# Patient Record
Sex: FEMALE | Race: ASIAN | Employment: PART TIME | URBAN - METROPOLITAN AREA
[De-identification: names, ages, dates, MRNs, and addresses within clinical notes are randomized per-mention and may not be internally consistent; named-entity substitution may affect disease eponyms.]

---

## 2017-01-12 ENCOUNTER — OFFICE VISIT (OUTPATIENT)
Dept: ORTHOPEDIC SURGERY | Age: 32
End: 2017-01-12

## 2017-01-12 VITALS
HEART RATE: 67 BPM | SYSTOLIC BLOOD PRESSURE: 98 MMHG | BODY MASS INDEX: 24.45 KG/M2 | TEMPERATURE: 97.6 F | DIASTOLIC BLOOD PRESSURE: 58 MMHG | HEIGHT: 63 IN | WEIGHT: 138 LBS

## 2017-01-12 DIAGNOSIS — S63.8X2A LEFT SCAPHOLUNATE LIGAMENT TEAR, INITIAL ENCOUNTER: Primary | ICD-10-CM

## 2017-01-12 NOTE — LETTER
NOTIFICATION RETURN TO WORK / SCHOOL 
 
1/12/2017 9:04 AM 
 
Ms. Robe Chew [address] [facility] [id number] To Whom It May Concern: 
 
Robe Chew is currently under the care of [address] [place] [address] Praful Martin. She cannot do push ups or repetitive activities with the left hand. These are permanent restrictions. If there are questions or concerns please have the patient contact our office. Sincerely, Shan Bah MD

## 2017-01-12 NOTE — PROGRESS NOTES
Eligio Laughlin  1985   Chief Complaint   Patient presents with    Hand Pain     bilateral        HISTORY OF PRESENT ILLNESS  Eligio Laughlin is a 36 y.o. female who presents today for evaluation of bilateral hand pain, L>R. She was a previous Dr. Kong Gr patient. She states the onset of pain happened after doing push-ups during duty. She states she works in "[employer] and works for [employer] in [place]. She notes she has sedentary positions for both jobs. She notes the majority of her pain is while doing push-ups. Allergies   Allergen Reactions    Pcn [Penicillins] Unable to Obtain        Past Medical History   Diagnosis Date    Bilateral wrist pain       Social History     Social History    Marital status:      Spouse name: N/A    Number of children: N/A    Years of education: N/A     Occupational History    Not on file. Social History Main Topics    Smoking status: Never Smoker    Smokeless tobacco: Never Used    Alcohol use 0.6 oz/week     1 Glasses of wine per week      Comment: SOCIALLY    Drug use: No    Sexual activity: Not on file     Other Topics Concern    Not on file     Social History Narrative      Past Surgical History   Procedure Laterality Date    Hx  section        Family History   Problem Relation Age of Onset    Other Mother      STOMACH PROBLEMS    Elevated Lipids Mother     GERD Mother     No Known Problems Father       Current Outpatient Prescriptions   Medication Sig    ASCORBATE CALCIUM (VITAMIN C PO) Take  by mouth.  ERGOCALCIFEROL, VITAMIN D2, (VITAMIN D2 PO) Take  by mouth.  therapeutic multivitamin (THERAGRAN) tablet Take 1 Tab by mouth daily.  metroNIDAZOLE (FLAGYL) 500 mg tablet Take  by mouth three (3) times daily.  naproxen (NAPROSYN) 500 mg tablet Take 500 mg by mouth two (2) times daily (with meals).     traMADol (ULTRAM) 50 mg tablet Take 1 Tab by mouth every eight (8) hours as needed for Pain. Max Daily Amount: 150 mg. No current facility-administered medications for this visit. REVIEW OF SYSTEM   Patient denies: Weight loss, Fever/Chills, HA, Visual changes, Fatigue, Chest pain, SOB, Abdominal pain, N/V/D/C, Blood in stool or urine, Edema. Pertinent positive as above in HPI. All others were negative    PHYSICAL EXAM:   Visit Vitals    BP 98/58 (BP 1 Location: Left arm, BP Patient Position: Sitting)    Pulse 67    Temp 97.6 °F (36.4 °C) (Oral)    Ht 5' 3\" (1.6 m)    Wt 138 lb (62.6 kg)    BMI 24.45 kg/m2     The patient is a well-developed, well-nourished female   in no acute distress. The patient is alert and oriented times three. The patient is alert and oriented times three. Mood and affect are normal.  LYMPHATIC: lymph nodes are not enlarged and are within normal limits  SKIN: normal in color and non tender to palpation. There are no bruises or abrasions noted. NEUROLOGICAL: Motor sensory exam is within normal limits. Reflexes are equal bilaterally. There is normal sensation to pinprick and light touch  MUSCULOSKELETAL:  Examination Left Hand Right Hand   Skin Intact Intact   Deformity - -   Swelling - -   Tenderness +, scapholunate ligament  -   Tenderness A1 Pulley - -   Finger flexion Full Full   Finger extension Full Full   Thenar Eminence Atrophy - -   Sensation Normal Normal   Capillary refill - -   Heberden's nodes - -   Dupuytren's - -     Examination Left Wrist Right Wrist   Skin Intact Intact   Tenderness +, scapholunte ligament  -   Flexion 60 60   Extension 60 60   Deformity - -   Effusion - -   Tinnel's sign - -   Phalen's test - -   Finklestein maneuver - -   Pain with thumb abduction - -     IMAGING: MRI of the left wrist dated 7/11/16 was reviewed and read:   IMPRESSION:  1. Moderate arthritic changes at the central left wrist, scapholunate and  lunocapitate regions. Likely degenerative tearing involving the scapholunate  ligament, but no hope rupture. Accompanying extrinsic wrist ligament thickening  and edema. 2. Small ganglion cyst likely developing on the volar radius as above. MRI of the right wrist dated 2/8/16 was reviewed and read:   IMPRESSION:   1. Defect in the volar and proximal aspect of the scapholunate ligament with an  intact dorsal component. 2. Minimal degenerative changes of the first carpometacarpal joint. IMPRESSION:      ICD-10-CM ICD-9-CM    1. Left scapholunate ligament tear, sequela S63.8X2S 905.7         PLAN: 1. She will return to work with the restrictions of no push-ups and no repetitive activities of the left wrist.   2. She will follow up as needed.    Follow-up Disposition: Not on File    Scribed by Aditi Wan [facility]) as dictated by MD Tulio Be M.D.   [facility]

## 2017-01-12 NOTE — PATIENT INSTRUCTIONS
Wrist Sprain: Care Instructions  Your Care Instructions    Your wrist hurts because you have stretched or torn ligaments, which connect the bones in your wrist.  Wrist sprains usually take from 2 to 10 weeks to heal, but some take longer. Usually, the more pain you have, the more severe your wrist sprain is and the longer it will take to heal. You can heal faster and regain strength in your wrist with good home treatment. Follow-up care is a key part of your treatment and safety. Be sure to make and go to all appointments, and call your doctor if you are having problems. It's also a good idea to know your test results and keep a list of the medicines you take. How can you care for yourself at home? · Prop up your arm on a pillow when you ice it or anytime you sit or lie down for the next 3 days. Try to keep your wrist above the level of your heart. This will help reduce swelling. · Put ice or cold packs on your wrist for 10 to 20 minutes at a time. Try to do this every 1 to 2 hours for the next 3 days (when you are awake) or until the swelling goes down. Put a thin cloth between the ice pack and your skin. · After 2 or 3 days, if your swelling is gone, apply a heating pad set on low or a warm cloth to your wrist. This helps keep your wrist flexible. Some doctors suggest that you go back and forth between hot and cold. · If you have an elastic bandage, keep it on for the next 24 to 36 hours. The bandage should be snug but not so tight that it causes numbness or tingling. To rewrap the wrist, wrap the bandage around the hand a few times, beginning at the fingers. Then wrap it around the hand between the thumb and index finger, ending by circling the wrist several times. · If your doctor gave you a splint or brace, wear it as directed to protect your wrist until it has healed. · Take pain medicines exactly as directed. ¨ If the doctor gave you a prescription medicine for pain, take it as prescribed.   ¨ If you are not taking a prescription pain medicine, ask your doctor if you can take an over-the-counter medicine. · Try not to use your injured wrist and hand. When should you call for help? Call your doctor now or seek immediate medical care if:  · Your hand or fingers are cool or pale or change color. Watch closely for changes in your health, and be sure to contact your doctor if:  · Your pain gets worse. · Your wrist has not improved after 1 week. Where can you learn more? Go to http://aminta-thanh.info/. Enter G541 in the search box to learn more about \"Wrist Sprain: Care Instructions. \"  Current as of: May 23, 2016  Content Version: 11.1  © 7679-8735 [email or web address]. Care instructions adapted under license by [employer] (which disclaims liability or warranty for this information). If you have questions about a medical condition or this instruction, always ask your healthcare professional. [address] any warranty or liability for your use of this information.

## 2019-03-01 ENCOUNTER — OFFICE VISIT (OUTPATIENT)
Dept: INTERNAL MEDICINE CLINIC | Age: 34
End: 2019-03-01

## 2019-03-01 VITALS
RESPIRATION RATE: 18 BRPM | OXYGEN SATURATION: 99 % | HEART RATE: 72 BPM | HEIGHT: 63 IN | WEIGHT: 145 LBS | SYSTOLIC BLOOD PRESSURE: 104 MMHG | TEMPERATURE: 99 F | DIASTOLIC BLOOD PRESSURE: 72 MMHG | BODY MASS INDEX: 25.69 KG/M2

## 2019-03-01 DIAGNOSIS — R19.4 CHANGE IN BOWEL HABITS: Primary | ICD-10-CM

## 2019-03-01 NOTE — PROGRESS NOTES
Chief Complaint   Patient presents with    Establish Care    Nausea     after lunch and it goes away towards the end of the day      1. Have you been to the ER, urgent care clinic since your last visit? Hospitalized since your last visit? No    2. Have you seen or consulted any other health care providers outside of the [address] [place] [address] since your last visit? Include any pap smears or colon screening.  No

## 2019-03-01 NOTE — PROGRESS NOTES
HISTORY OF PRESENT ILLNESS  Yarelis Ackerman is a 34 y.o. female. HPI Ms. Aurelio Fox is here to establish care and for c/o nausea for the past week. She denies abdominal pain, but describes it as a discomfort. She thinks her sx are worsened by dairy. She has eliminated some dairy, but sx are still present. She experiences nausea, dizziness, discomfort and then will have a bowel movement. Her sx resolve with the bowl movement. She drinks tea to help her sx. She has not had any other OTC medications. Review of Systems   Constitutional: Negative. HENT: Negative. Respiratory: Negative. Cardiovascular: Negative. Gastrointestinal: Positive for nausea. Negative for blood in stool, diarrhea (having 2-3 bowel movments per day, stools are soft), melena and vomiting. Increased belching   Genitourinary: Negative. Neurological: Negative for dizziness. Physical Exam   Constitutional: She is oriented to person, place, and time. She appears well-developed and well-nourished. No distress. HENT:   Head: Normocephalic and atraumatic. Eyes: Conjunctivae are normal.   Cardiovascular: Normal rate and regular rhythm. Pulmonary/Chest: Effort normal and breath sounds normal.   Abdominal: Soft. Bowel sounds are normal. There is no tenderness. Musculoskeletal: She exhibits no edema. Neurological: She is alert and oriented to person, place, and time. ASSESSMENT and PLAN    ICD-10-CM ICD-9-CM    1. Change in bowel habits R19.4 787.99 CBC WITH AUTOMATED DIFF      METABOLIC PANEL, COMPREHENSIVE      CELIAC ANTIBODY PROFILE      TSH 3RD GENERATION      COLLECTION VENOUS BLOOD,VENIPUNCTURE      TSH 3RD GENERATION     Pt verbalized understanding of their condition and diagnoses, treatment plan,  as well as side effects of any new medications prescribed. Will call or send letter with lab results and recommendations.

## 2019-03-05 LAB
ALBUMIN SERPL-MCNC: 4.7 G/DL (ref 3.5–5.5)
ALBUMIN/GLOB SERPL: 1.6 {RATIO} (ref 1.2–2.2)
ALP SERPL-CCNC: 74 IU/L (ref 39–117)
ALT SERPL-CCNC: 21 IU/L (ref 0–32)
AST SERPL-CCNC: 22 IU/L (ref 0–40)
BASOPHILS # BLD AUTO: 0 X10E3/UL (ref 0–0.2)
BASOPHILS NFR BLD AUTO: 0 %
BILIRUB SERPL-MCNC: 1.2 MG/DL (ref 0–1.2)
BUN SERPL-MCNC: 9 MG/DL (ref 6–20)
BUN/CREAT SERPL: 14 (ref 9–23)
CALCIUM SERPL-MCNC: 9.7 MG/DL (ref 8.7–10.2)
CHLORIDE SERPL-SCNC: 100 MMOL/L (ref 96–106)
CO2 SERPL-SCNC: 25 MMOL/L (ref 20–29)
CREAT SERPL-MCNC: 0.66 MG/DL (ref 0.57–1)
EOSINOPHIL # BLD AUTO: 0.3 X10E3/UL (ref 0–0.4)
EOSINOPHIL NFR BLD AUTO: 4 %
ERYTHROCYTE [DISTWIDTH] IN BLOOD BY AUTOMATED COUNT: 12.4 % (ref 12.3–15.4)
GLIADIN PEPTIDE IGA SER-ACNC: 6 UNITS (ref 0–19)
GLIADIN PEPTIDE IGG SER-ACNC: 2 UNITS (ref 0–19)
GLOBULIN SER CALC-MCNC: 3 G/DL (ref 1.5–4.5)
GLUCOSE SERPL-MCNC: 76 MG/DL (ref 65–99)
HCT VFR BLD AUTO: 42.3 % (ref 34–46.6)
HGB BLD-MCNC: 14.2 G/DL (ref 11.1–15.9)
IGA SERPL-MCNC: 324 MG/DL (ref 87–352)
IMM GRANULOCYTES # BLD AUTO: 0 X10E3/UL (ref 0–0.1)
IMM GRANULOCYTES NFR BLD AUTO: 0 %
LYMPHOCYTES # BLD AUTO: 2.5 X10E3/UL (ref 0.7–3.1)
LYMPHOCYTES NFR BLD AUTO: 34 %
MCH RBC QN AUTO: 34 PG (ref 26.6–33)
MCHC RBC AUTO-ENTMCNC: 33.6 G/DL (ref 31.5–35.7)
MCV RBC AUTO: 101 FL (ref 79–97)
MONOCYTES # BLD AUTO: 0.5 X10E3/UL (ref 0.1–0.9)
MONOCYTES NFR BLD AUTO: 7 %
NEUTROPHILS # BLD AUTO: 4 X10E3/UL (ref 1.4–7)
NEUTROPHILS NFR BLD AUTO: 55 %
PLATELET # BLD AUTO: 283 X10E3/UL (ref 150–379)
POTASSIUM SERPL-SCNC: 4.3 MMOL/L (ref 3.5–5.2)
PROT SERPL-MCNC: 7.7 G/DL (ref 6–8.5)
RBC # BLD AUTO: 4.18 X10E6/UL (ref 3.77–5.28)
SODIUM SERPL-SCNC: 140 MMOL/L (ref 134–144)
TSH SERPL DL<=0.005 MIU/L-ACNC: 0.95 UIU/ML (ref 0.45–4.5)
TTG IGA SER-ACNC: <2 U/ML (ref 0–3)
TTG IGG SER-ACNC: <2 U/ML (ref 0–5)
WBC # BLD AUTO: 7.3 X10E3/UL (ref 3.4–10.8)

## 2019-03-07 ENCOUNTER — TELEPHONE (OUTPATIENT)
Dept: INTERNAL MEDICINE CLINIC | Age: 34
End: 2019-03-07

## 2019-03-07 DIAGNOSIS — R10.9 ABDOMINAL DISCOMFORT: Primary | ICD-10-CM

## 2019-03-07 DIAGNOSIS — D75.89 MACROCYTOSIS WITHOUT ANEMIA: ICD-10-CM

## 2019-03-07 NOTE — TELEPHONE ENCOUNTER
Her celiac panel was negative. Her MCV was slightly elevated. This could indicate a B-12 or folate deficiency, or it may be just mild dehydration. She can return for B-12 and Folate. I can't remember if she wanted referred to GI for her stomach or bowel issues. I can put in a referral if she wants.